# Patient Record
Sex: MALE | Race: WHITE | Employment: OTHER | URBAN - METROPOLITAN AREA
[De-identification: names, ages, dates, MRNs, and addresses within clinical notes are randomized per-mention and may not be internally consistent; named-entity substitution may affect disease eponyms.]

---

## 2019-09-19 ENCOUNTER — PRE VISIT (OUTPATIENT)
Dept: CARDIOLOGY | Facility: CLINIC | Age: 64
End: 2019-09-19

## 2019-09-20 PROBLEM — R00.2 PALPITATIONS: Status: ACTIVE | Noted: 2019-09-20

## 2019-09-20 PROBLEM — M79.89 LEG SWELLING: Status: ACTIVE | Noted: 2019-09-20

## 2019-09-23 ENCOUNTER — OFFICE VISIT (OUTPATIENT)
Dept: CARDIOLOGY | Facility: CLINIC | Age: 64
End: 2019-09-23
Payer: COMMERCIAL

## 2019-09-23 VITALS
DIASTOLIC BLOOD PRESSURE: 90 MMHG | WEIGHT: 185.1 LBS | HEART RATE: 80 BPM | BODY MASS INDEX: 25.91 KG/M2 | SYSTOLIC BLOOD PRESSURE: 132 MMHG | HEIGHT: 71 IN

## 2019-09-23 DIAGNOSIS — I47.29 NSVT (NONSUSTAINED VENTRICULAR TACHYCARDIA) (H): Primary | ICD-10-CM

## 2019-09-23 DIAGNOSIS — I21.3 ST ELEVATION MYOCARDIAL INFARCTION (STEMI), UNSPECIFIED ARTERY (H): ICD-10-CM

## 2019-09-23 DIAGNOSIS — I25.10 CORONARY ARTERY DISEASE INVOLVING NATIVE CORONARY ARTERY OF NATIVE HEART WITHOUT ANGINA PECTORIS: ICD-10-CM

## 2019-09-23 PROCEDURE — 93005 ELECTROCARDIOGRAM TRACING: CPT | Performed by: INTERNAL MEDICINE

## 2019-09-23 PROCEDURE — 99214 OFFICE O/P EST MOD 30 MIN: CPT | Performed by: INTERNAL MEDICINE

## 2019-09-23 RX ORDER — METOPROLOL SUCCINATE 25 MG/1
25 TABLET, EXTENDED RELEASE ORAL EVERY EVENING
Qty: 90 TABLET | Refills: 3 | Status: SHIPPED | OUTPATIENT
Start: 2019-09-23 | End: 2020-12-28

## 2019-09-23 ASSESSMENT — MIFFLIN-ST. JEOR: SCORE: 1646.74

## 2019-09-23 NOTE — LETTER
"9/23/2019    KASANDRA HSU [facility] [address] [address]    RE: Jhonny Kauffman       Dear Colleague,    I had the pleasure of seeing Jhonny Kauffman in the [facility].      Cardiology       I had the pleasure of seeing your patient, Mr. Jhonny Kauffman, in the Cardiovascular Medicine Clinic at [facility].  I had the pleasure of meeting this 64-year-old gentleman during his lateral MI which he suffered in 11/2015.  At that point, the patient had symptoms which required emergent angioplasty.  He had single vessel coronary disease, namely a circumflex which required a drug-eluting stent.  Remainder of his coronaries had moderate nonocclusive disease.    I have not seen him 2016.  Patient overall has done well however reports of few episodes of palpitations.  He describes a skipped beat which occurs and a sensation in his throat.  These are self-limiting and typically subside on their own.  He has stopped all of his medications with the exception of aspirin.  This includes his beta-blocker and ACE inhibitor along these statin therapy.  He states that now that he is on Medicare he is able to go to regular clinic visits.  He presents to reestablish care.  Another issue more recently is a diagnosis of superficial thrombophlebitis for which conservative therapy was prescribed.  He does state that he is relatively sedentary at work and sits at his desk the majority of the day.  His symptoms are improving.       PHYSICAL EXAMINATION:   VITAL SIGNS:  Blood pressure (!) 132/90, pulse 80, height 1.803 m (5' 11\"), weight 84 kg (185 lb 1.6 oz).  GENERAL:  The patient is alert, oriented, in no apparent distress.   HEENT:  Oropharynx clear, no sinus tenderness.   NECK:  No JVP, no lymphadenopathy, no carotid bruits.   CARDIOVASCULAR:  Regular rate and rhythm, normal S1, S2, no murmurs, gallops or rubs.   LUNGS:  Coarse but clear.   ABDOMEN:  Soft, nontender, nondistended. "   EXTREMITIES:  No clubbing, cyanosis or edema.      ASSESSMENT:   1.  Lateral MI, status post PCI to circumflex 2015  2.  Moderate nonocclusive disease in LAD.   3.  Low normal LV systolic function with inferolateral wall motion abnormality.   4.  Peak troponin of 98.   5.  Hyperlipidemia.   6.  Family history of coronary artery disease.   7.  Mildly enlarged proximal ascending aorta at 4.0 cm.      RECOMMENDATIONS:   1.  Atherosclerotic coronary disease.  No return of symptoms.  We will restart his beta-blocker and I feel this is also useful for his palpitations.  Continue with aspirin.  2.  We will get an echocardiogram to follow-up on his ascending aortic dilatation.  3.  We have ordered updated lab work namely cholesterol panel.  If it is elevated patient is willing to reconsider taking his Lipitor again.  4.  We also discussed with patient that this year or next he is due for a surveillance stress test.  We will address this when he returns back in November.  5.  Patient would like to get back to his marathon running.  I will get the above mentioned studies and possibly a stress test prior to clearing him to resume his heavy exercise.  6.  Return to clinic November 21, 2019.      Thank you for allowing me to participate in the care of your patient.    Sincerely,     Viridiana Figueroa MD     [facility]    cc:   Casey Waite MD  [address] [address]

## 2019-09-23 NOTE — PROGRESS NOTES
"  Cardiology       I had the pleasure of seeing your patient, Mr. Jhonny Kauffman, in the Cardiovascular Medicine Clinic at [facility].  I had the pleasure of meeting this 64-year-old gentleman during his lateral MI which he suffered in 11/2015.  At that point, the patient had symptoms which required emergent angioplasty.  He had single vessel coronary disease, namely a circumflex which required a drug-eluting stent.  Remainder of his coronaries had moderate nonocclusive disease.    I have not seen him 2016.  Patient overall has done well however reports of few episodes of palpitations.  He describes a skipped beat which occurs and a sensation in his throat.  These are self-limiting and typically subside on their own.  He has stopped all of his medications with the exception of aspirin.  This includes his beta-blocker and ACE inhibitor along these statin therapy.  He states that now that he is on Medicare he is able to go to regular clinic visits.  He presents to reestablish care.  Another issue more recently is a diagnosis of superficial thrombophlebitis for which conservative therapy was prescribed.  He does state that he is relatively sedentary at work and sits at his desk the majority of the day.  His symptoms are improving.       PHYSICAL EXAMINATION:   VITAL SIGNS:  Blood pressure (!) 132/90, pulse 80, height 1.803 m (5' 11\"), weight 84 kg (185 lb 1.6 oz).  GENERAL:  The patient is alert, oriented, in no apparent distress.   HEENT:  Oropharynx clear, no sinus tenderness.   NECK:  No JVP, no lymphadenopathy, no carotid bruits.   CARDIOVASCULAR:  Regular rate and rhythm, normal S1, S2, no murmurs, gallops or rubs.   LUNGS:  Coarse but clear.   ABDOMEN:  Soft, nontender, nondistended.   EXTREMITIES:  No clubbing, cyanosis or edema.      ASSESSMENT:   1.  Lateral MI, status post PCI to circumflex 2015  2.  Moderate nonocclusive disease in LAD.   3.  Low normal LV systolic function with inferolateral wall motion " abnormality.   4.  Peak troponin of 98.   5.  Hyperlipidemia.   6.  Family history of coronary artery disease.   7.  Mildly enlarged proximal ascending aorta at 4.0 cm.      RECOMMENDATIONS:   1.  Atherosclerotic coronary disease.  No return of symptoms.  We will restart his beta-blocker and I feel this is also useful for his palpitations.  Continue with aspirin.  2.  We will get an echocardiogram to follow-up on his ascending aortic dilatation.  3.  We have ordered updated lab work namely cholesterol panel.  If it is elevated patient is willing to reconsider taking his Lipitor again.  4.  We also discussed with patient that this year or next he is due for a surveillance stress test.  We will address this when he returns back in November.  5.  Patient would like to get back to his marathon running.  I will get the above mentioned studies and possibly a stress test prior to clearing him to resume his heavy exercise.  6.  Return to clinic November 21, 2019.      Viridiana Figueroa MD

## 2019-10-23 ENCOUNTER — HOSPITAL ENCOUNTER (OUTPATIENT)
Dept: CARDIOLOGY | Facility: CLINIC | Age: 64
Discharge: HOME | End: 2019-10-23
Attending: INTERNAL MEDICINE | Admitting: INTERNAL MEDICINE
Payer: COMMERCIAL

## 2019-10-23 DIAGNOSIS — I25.10 CORONARY ARTERY DISEASE INVOLVING NATIVE CORONARY ARTERY OF NATIVE HEART WITHOUT ANGINA PECTORIS: ICD-10-CM

## 2019-10-23 LAB
ALT SERPL W P-5'-P-CCNC: 19 U/L (ref 0–70)
CHOLEST SERPL-MCNC: 224 MG/DL
HDLC SERPL-MCNC: 45 MG/DL
LDLC SERPL CALC-MCNC: 155 MG/DL
NONHDLC SERPL-MCNC: 179 MG/DL
TRIGL SERPL-MCNC: 120 MG/DL

## 2019-10-23 PROCEDURE — 93306 TTE W/DOPPLER COMPLETE: CPT | Mod: 26 | Performed by: INTERNAL MEDICINE

## 2019-10-23 PROCEDURE — 93306 TTE W/DOPPLER COMPLETE: CPT

## 2019-10-23 PROCEDURE — 36415 COLL VENOUS BLD VENIPUNCTURE: CPT | Performed by: INTERNAL MEDICINE

## 2019-10-23 PROCEDURE — 80061 LIPID PANEL: CPT | Performed by: INTERNAL MEDICINE

## 2019-10-23 PROCEDURE — 84460 ALANINE AMINO (ALT) (SGPT): CPT | Performed by: INTERNAL MEDICINE

## 2019-10-25 DIAGNOSIS — I25.10 CORONARY ARTERY DISEASE INVOLVING NATIVE CORONARY ARTERY OF NATIVE HEART WITHOUT ANGINA PECTORIS: Primary | ICD-10-CM

## 2019-10-25 RX ORDER — ATORVASTATIN CALCIUM 40 MG/1
40 TABLET, FILM COATED ORAL DAILY
Qty: 90 TABLET | Refills: 3 | Status: SHIPPED | OUTPATIENT
Start: 2019-10-25 | End: 2020-04-29

## 2019-10-25 NOTE — PROGRESS NOTES
Left VM with patient regarding the need to restart his Lipitor 40 mg once a day. Jhonny had just had his numbers check and his cholesterol has gone up to 224 and he has not been taking his meds for awhile. Spoke with Dr Figueroa and reordered his Lipitor and will recheck a lipid panel in 3 months. Told Jhonny to call with any questions.Mavis Sanders RN

## 2019-11-19 DIAGNOSIS — I25.10 CORONARY ARTERY DISEASE INVOLVING NATIVE CORONARY ARTERY OF NATIVE HEART WITHOUT ANGINA PECTORIS: Primary | ICD-10-CM

## 2019-11-19 NOTE — PROGRESS NOTES
Spoke with Jhonny regarding his last echo and that ascending aorta is Mildly dilated.In comparison to the previous report dated 09/28/2016, the findings are  Similar. He states he will follow up with Dr Figueroa in the next 6 months and that order was placed. He also informed me that he did not restart his Lipitor and wishes to make changes to his diet and exercise instead. Orders also placed for repeat lipid panel when he returns for follow up visit.Mavis Sanders RN

## 2020-03-10 ENCOUNTER — HEALTH MAINTENANCE LETTER (OUTPATIENT)
Age: 65
End: 2020-03-10

## 2020-03-11 ENCOUNTER — TRANSFERRED RECORDS (OUTPATIENT)
Dept: HEALTH INFORMATION MANAGEMENT | Facility: CLINIC | Age: 65
End: 2020-03-11

## 2020-04-27 ENCOUNTER — HOSPITAL ENCOUNTER (OUTPATIENT)
Dept: LAB | Facility: CLINIC | Age: 65
Discharge: HOME | End: 2020-04-27
Attending: INTERNAL MEDICINE | Admitting: INTERNAL MEDICINE
Payer: COMMERCIAL

## 2020-04-27 DIAGNOSIS — I25.10 CORONARY ARTERY DISEASE INVOLVING NATIVE CORONARY ARTERY OF NATIVE HEART WITHOUT ANGINA PECTORIS: ICD-10-CM

## 2020-04-27 LAB
CHOLEST SERPL-MCNC: 239 MG/DL
HDLC SERPL-MCNC: 46 MG/DL
LDLC SERPL CALC-MCNC: 173 MG/DL
NONHDLC SERPL-MCNC: 193 MG/DL
TRIGL SERPL-MCNC: 98 MG/DL

## 2020-04-27 PROCEDURE — 36415 COLL VENOUS BLD VENIPUNCTURE: CPT | Performed by: INTERNAL MEDICINE

## 2020-04-27 PROCEDURE — 80061 LIPID PANEL: CPT | Performed by: INTERNAL MEDICINE

## 2020-04-29 ENCOUNTER — VIRTUAL VISIT (OUTPATIENT)
Dept: CARDIOLOGY | Facility: CLINIC | Age: 65
End: 2020-04-29
Attending: INTERNAL MEDICINE
Payer: COMMERCIAL

## 2020-04-29 DIAGNOSIS — I10 ESSENTIAL HYPERTENSION: ICD-10-CM

## 2020-04-29 DIAGNOSIS — E78.00 PURE HYPERCHOLESTEROLEMIA: ICD-10-CM

## 2020-04-29 DIAGNOSIS — I25.10 CORONARY ARTERY DISEASE INVOLVING NATIVE CORONARY ARTERY OF NATIVE HEART WITHOUT ANGINA PECTORIS: Primary | ICD-10-CM

## 2020-04-29 PROCEDURE — 99213 OFFICE O/P EST LOW 20 MIN: CPT | Mod: 95 | Performed by: INTERNAL MEDICINE

## 2020-04-29 RX ORDER — ATORVASTATIN CALCIUM 40 MG/1
40 TABLET, FILM COATED ORAL DAILY
Qty: 90 TABLET | Refills: 3 | Status: SHIPPED | OUTPATIENT
Start: 2020-04-29 | End: 2020-08-26

## 2020-04-29 NOTE — PROGRESS NOTES
"Jhonny Kauffman is a 64 year old male who is being evaluated via a billable video visit.      The patient has been notified of following:     \"This video visit will be conducted via a call between you and your physician/provider. We have found that certain health care needs can be provided without the need for an in-person physical exam.  This service lets us provide the care you need with a video conversation.  If a prescription is necessary we can send it directly to your pharmacy.  If lab work is needed we can place an order for that and you can then stop by our lab to have the test done at a later time.    Video visits are billed at different rates depending on your insurance coverage.  Please reach out to your insurance provider with any questions.    If during the course of the call the physician/provider feels a video visit is not appropriate, you will not be charged for this service.\"    Patient has given verbal consent for Video visit? Yes    How would you like to obtain your AVS? Jovanny    Patient would like the video invitation sent by: Text to cell phone: [phone]    Will anyone else be joining your video visit? No      HPI:      Patient is a 64-year-old male who underwent PCI to circumflex vessel in 2015.  Moderate disease noted elsewhere however nonocclusive.  He has had no return of his symptoms.  He had an echocardiogram performed 2019 the findings are at baseline.  He had some resistance and reluctance to take his medical therapy.  Initial difficulty was financial however since he is on Medicare he has been able to afford his medical therapy.  Also he has been reluctant to take any statin therapy.  We have tried diet therapy and his cholesterol levels from a total cholesterol and LDL standpoint are still elevated.  Here to discuss findings and plan for additional care no active symptoms.    1.  Lateral MI, status post PCI to circumflex 2015  2.  Moderate nonocclusive disease in LAD.   3.  Low " normal LV systolic function with inferolateral wall motion abnormality.   4.  Peak troponin of 98.   5.  Hyperlipidemia.   6.  Family history of coronary artery disease.   7.  Mildly enlarged proximal ascending aorta at 4.0 cm.     Review Of Systems  Skin: NEGATIVE  Eyes:Ears/Nose/Throat: NEGATIVE  Respiratory: NEGATIVE  Cardiovascular:  Palpitations the last couple days  Gastrointestinal: NEGATIVE  Genitourinary:NEGATIVE   Musculoskeletal: Joint pain  Neurologic: NEGATIVE  Psychiatric: NEGATIVE  Hematologic/Lymphatic/Immunologic: NEGATIVE  Endocrine:  NEGATIVE    Echo: 2019  The left ventricle is normal in size. Left Ventricle false tendon. There is  normal left ventricular wall thickness. Left ventricular systolic function is  low normal. The visual ejection fraction is estimated at 50-55%. Left  ventricular diastolic function is normal. There is moderate hypokinesis of the  mid to basal inferolateral wall and the basal anterolateral wall.  The right ventricle is normal size. The right ventricular systolic function is  normal.  Trace mitral and tricuspid regurgitation.  Mild aortic root dilatation. The ascending aorta is Mildly dilated.  No pericardial effusion.  In comparison to the previous report dated 09/28/2016, the findings are  similar.    GENERAL: healthy, alert and no distress  EYES: Eyes grossly normal to inspection, conjunctivae and sclerae normal  RESP: no audible wheeze, cough, or visible cyanosis.  No visible retractions or increased work of breathing.  Able to speak fully in complete sentences.  NEURO: Cranial nerves grossly intact, mentation intact and speech normal  PSYCH: mentation appears normal, affect normal/bright, judgement and insight intact, normal speech and appearance well-groomed      Plan    1.  Continue aspirin and beta-blocker  2.  We had a lengthy discussion and patient is now willing to take statin therapy.  He will start Lipitor 40 mg daily and we will recheck his cholesterol in 3  months time  3.  Return to clinic in 1 year  4.  We will defer nuclear myocardial perfusion study from a surveillance standpoint till next year due to COVID pandemic and patient asymptomatic at present.      Lola MOYA    Video-Visit Details    Type of service:  Video Visit    Video Start Time: 2:38 PM  Video End Time: 2:53 PM    Originating Location (pt. Location): Home    Distant Location (provider location):  [facility]     Mode of Communication:  Video Conference via Doximity      Viridiana iFgueroa MD

## 2020-04-29 NOTE — LETTER
4/29/2020      RE: Jhonny Kauffman  [address] [place] [address]       Dear Colleague,    Thank you for the opportunity to participate in the care of your patient, Jhonny Kauffman, at the [facility]. Please see a copy of my visit note below.    Patient is a 64-year-old male who underwent PCI to circumflex vessel in 2015.  Moderate disease noted elsewhere however nonocclusive.  He has had no return of his symptoms.  He had an echocardiogram performed 2019 the findings are at baseline.  He had some resistance and reluctance to take his medical therapy.  Initial difficulty was financial however since he is on Medicare he has been able to afford his medical therapy.  Also he has been reluctant to take any statin therapy.  We have tried diet therapy and his cholesterol levels from a total cholesterol and LDL standpoint are still elevated.  Here to discuss findings and plan for additional care no active symptoms.    1.  Lateral MI, status post PCI to circumflex 2015  2.  Moderate nonocclusive disease in LAD.   3.  Low normal LV systolic function with inferolateral wall motion abnormality.   4.  Peak troponin of 98.   5.  Hyperlipidemia.   6.  Family history of coronary artery disease.   7.  Mildly enlarged proximal ascending aorta at 4.0 cm.     Review Of Systems  Skin: NEGATIVE  Eyes:Ears/Nose/Throat: NEGATIVE  Respiratory: NEGATIVE  Cardiovascular:  Palpitations the last couple days  Gastrointestinal: NEGATIVE  Genitourinary:NEGATIVE   Musculoskeletal: Joint pain  Neurologic: NEGATIVE  Psychiatric: NEGATIVE  Hematologic/Lymphatic/Immunologic: NEGATIVE  Endocrine:  NEGATIVE    Echo: 2019  The left ventricle is normal in size. Left Ventricle false tendon. There is  normal left ventricular wall thickness. Left ventricular systolic function is  low normal. The visual ejection fraction is estimated at 50-55%. Left  ventricular  diastolic function is normal. There is moderate hypokinesis of the  mid to basal inferolateral wall and the basal anterolateral wall.  The right ventricle is normal size. The right ventricular systolic function is  normal.  Trace mitral and tricuspid regurgitation.  Mild aortic root dilatation. The ascending aorta is Mildly dilated.  No pericardial effusion.  In comparison to the previous report dated 09/28/2016, the findings are  similar.    GENERAL: healthy, alert and no distress  EYES: Eyes grossly normal to inspection, conjunctivae and sclerae normal  RESP: no audible wheeze, cough, or visible cyanosis.  No visible retractions or increased work of breathing.  Able to speak fully in complete sentences.  NEURO: Cranial nerves grossly intact, mentation intact and speech normal  PSYCH: mentation appears normal, affect normal/bright, judgement and insight intact, normal speech and appearance well-groomed    Plan    1.  Continue aspirin and beta-blocker  2.  We had a lengthy discussion and patient is now willing to take statin therapy.  He will start Lipitor 40 mg daily and we will recheck his cholesterol in 3 months time  3.  Return to clinic in 1 year  4.  We will defer nuclear myocardial perfusion study from a surveillance standpoint till next year due to COVID pandemic and patient asymptomatic at present.    Please do not hesitate to contact me if you have any questions/concerns.     Sincerely,     Viridiana Figueroa MD

## 2020-07-01 ENCOUNTER — TELEPHONE (OUTPATIENT)
Dept: CARDIOLOGY | Facility: CLINIC | Age: 65
End: 2020-07-01

## 2020-07-01 DIAGNOSIS — R00.2 PALPITATIONS: Primary | ICD-10-CM

## 2020-07-01 NOTE — TELEPHONE ENCOUNTER
"Patient called to report feeling palpitations. He states he has had them for a while, \"but lately I would say I feel them about 75% of my day.\" He describes them as \"skipped beats\" and feels \"like a hollow feeling with sometimes an urge to cough.\" He states he discussed them with Dr. Figueroa during his visit with him in April 2020, but at that time they were infrequent. No testing was ordered. Patient is wondering what we could do. I told him I would discuss symptoms with Dr. Figueroa and see if there is any testing he would like to do. I will call him back.  Swathi Tompkins RN  [facility]    "

## 2020-07-02 NOTE — TELEPHONE ENCOUNTER
Reviewed pt call with Dr. Figueroa, he would like pt to have ZioPatch for 7 days then BENSON follow-up.     These recommendations were called out to the patient and left in a detailed voicemail. Direct contact information provided.    Swathi Tompkins RN  [facility]

## 2020-07-06 NOTE — TELEPHONE ENCOUNTER
Returned voicemail from patient. He inquired about his ability to golf and swim while wearing ziopatch. I left a voicemail for the patient and I informed him golf would be okay, not swimming. I asked him to bring any additional questions to his appt to apply patch. Direct contact information provided in voicemail as well.    Swathi Tompkins RN  [facility]

## 2020-07-20 ENCOUNTER — HOSPITAL ENCOUNTER (OUTPATIENT)
Dept: CARDIOLOGY | Facility: CLINIC | Age: 65
Discharge: HOME | End: 2020-07-20
Attending: INTERNAL MEDICINE | Admitting: INTERNAL MEDICINE
Payer: COMMERCIAL

## 2020-07-20 DIAGNOSIS — R00.2 PALPITATIONS: ICD-10-CM

## 2020-07-20 PROCEDURE — 0296T LEADLESS EKG MONITOR 3 TO 14 DAYS: CPT

## 2020-07-20 PROCEDURE — 0298T LEADLESS EKG MONITOR 3 TO 14 DAYS: CPT | Performed by: INTERNAL MEDICINE

## 2020-08-05 DIAGNOSIS — I35.0 AORTIC VALVE STENOSIS, ETIOLOGY OF CARDIAC VALVE DISEASE UNSPECIFIED: Primary | ICD-10-CM

## 2020-08-05 NOTE — PROGRESS NOTES
Jhonny called and would like a cbc added to his alb work, he has not had one done since 2015, Lab added.Mavis Sanders RN

## 2020-08-20 DIAGNOSIS — E78.00 PURE HYPERCHOLESTEROLEMIA: ICD-10-CM

## 2020-08-20 DIAGNOSIS — I35.0 AORTIC VALVE STENOSIS, ETIOLOGY OF CARDIAC VALVE DISEASE UNSPECIFIED: ICD-10-CM

## 2020-08-20 LAB
ALT SERPL W P-5'-P-CCNC: 30 U/L (ref 0–70)
CHOLEST SERPL-MCNC: 154 MG/DL
ERYTHROCYTE [DISTWIDTH] IN BLOOD BY AUTOMATED COUNT: 13.1 % (ref 10–15)
HCT VFR BLD AUTO: 46.9 % (ref 40–53)
HDLC SERPL-MCNC: 42 MG/DL
HGB BLD-MCNC: 15.1 G/DL (ref 13.3–17.7)
LDLC SERPL CALC-MCNC: 93 MG/DL
MCH RBC QN AUTO: 27.3 PG (ref 26.5–33)
MCHC RBC AUTO-ENTMCNC: 32.2 G/DL (ref 31.5–36.5)
MCV RBC AUTO: 85 FL (ref 78–100)
NONHDLC SERPL-MCNC: 112 MG/DL
PLATELET # BLD AUTO: 268 10E9/L (ref 150–450)
RBC # BLD AUTO: 5.54 10E12/L (ref 4.4–5.9)
TRIGL SERPL-MCNC: 93 MG/DL
WBC # BLD AUTO: 4.7 10E9/L (ref 4–11)

## 2020-08-20 PROCEDURE — 80061 LIPID PANEL: CPT | Performed by: INTERNAL MEDICINE

## 2020-08-20 PROCEDURE — 36415 COLL VENOUS BLD VENIPUNCTURE: CPT | Performed by: INTERNAL MEDICINE

## 2020-08-20 PROCEDURE — 84460 ALANINE AMINO (ALT) (SGPT): CPT | Performed by: INTERNAL MEDICINE

## 2020-08-20 PROCEDURE — 85027 COMPLETE CBC AUTOMATED: CPT | Performed by: INTERNAL MEDICINE

## 2020-08-26 ENCOUNTER — VIRTUAL VISIT (OUTPATIENT)
Dept: CARDIOLOGY | Facility: CLINIC | Age: 65
End: 2020-08-26
Attending: INTERNAL MEDICINE
Payer: COMMERCIAL

## 2020-08-26 DIAGNOSIS — I25.10 CORONARY ARTERY DISEASE INVOLVING NATIVE CORONARY ARTERY OF NATIVE HEART WITHOUT ANGINA PECTORIS: ICD-10-CM

## 2020-08-26 DIAGNOSIS — R00.2 PALPITATIONS: ICD-10-CM

## 2020-08-26 DIAGNOSIS — I21.3 ST ELEVATION MYOCARDIAL INFARCTION (STEMI), UNSPECIFIED ARTERY (H): Primary | ICD-10-CM

## 2020-08-26 DIAGNOSIS — E78.2 MIXED HYPERLIPIDEMIA: ICD-10-CM

## 2020-08-26 PROCEDURE — 99213 OFFICE O/P EST LOW 20 MIN: CPT | Mod: 95 | Performed by: NURSE PRACTITIONER

## 2020-08-26 RX ORDER — ATORVASTATIN CALCIUM 40 MG/1
40 TABLET, FILM COATED ORAL DAILY
Qty: 90 TABLET | Refills: 3 | Status: SHIPPED | OUTPATIENT
Start: 2020-08-26

## 2020-08-26 NOTE — LETTER
"8/26/2020    KASANDRA HSU [facility] [address] [address]    RE: Jhonny Kauffman       Dear Colleague,    I had the pleasure of seeing Jhonny Kauffman in the [facility].    Jhonny Kauffman is a 65 year old male who is being evaluated via a billable telephone visit.      The patient has been notified of following:     \"This telephone visit will be conducted via a call between you and your physician/provider. We have found that certain health care needs can be provided without the need for a physical exam.  This service lets us provide the care you need with a short phone conversation.  If a prescription is necessary we can send it directly to your pharmacy.  If lab work is needed we can place an order for that and you can then stop by our lab to have the test done at a later time.    Telephone visits are billed at different rates depending on your insurance coverage. During this emergency period, for some insurers they may be billed the same as an in-person visit.  Please reach out to your insurance provider with any questions.    If during the course of the call the physician/provider feels a telephone visit is not appropriate, you will not be charged for this service.\"    Patient has given verbal consent for Telephone visit?  Yes    What phone number would you like to be contacted at? [phone]    How would you like to obtain your AVS? MyChart  Review Of Systems  Skin: NEGATIVE  Eyes:Ears/Nose/Throat: NEGATIVE  Respiratory: NEGATIVE  Cardiovascular:NEGATIVE  Gastrointestinal: NEGATIVE  Genitourinary:NEGATIVE   Musculoskeletal: NEGATIVE  Neurologic: NEGATIVE  Psychiatric: NEGATIVE  Hematologic/Lymphatic/Immunologic: NEGATIVE  Endocrine:  NEGATIVE  Unable to assess vitals.   Steffi Proctor LPN    Phone call duration: 20 minutes    LUC ZAMARRIPA CNP     Reason for visit: Follow up Austyn Dee    Primary cardiologist: Dr. Figueroa     History of presenting " "illness:    Jhonny Kauffman, a pleasant 65 year old patient who has a past medical history of coronary artery disease status post lateral STEMI and PCI to his circumflex in 2015 with moderate nonocclusive disease noted elsewhere at that time, hyperlipidemia, family history of coronary artery disease, and mild enlargement of his proximal ascending aorta at 4.0 cm.    He previously has resistance to medical therapy due to financial concerns he is currently on Medicare and now has been able to afford his medications.  Although, he is resistant to statin therapy and despite diet modifications his LDL is still not within goal.  The patient had an office visit with Dr. Figueroa in April 2020 and at that time he elected to start Lipitor 40 mg daily.    Last month he called the clinic and reported palpitations that he described as \"skipped beats\" and \"like a hollow feeling that sometimes and urged to cough\".  The symptoms were reviewed with Dr. Figueroa and was recommended that he undergo a ZIO Patch and follow-up today.  The monitor revealed one episode of VT (5 beats) and 16 episodes of SVT and occasional PVCs with about a 1% burden.  His symptoms correlated with PVCs.  He also had fasting lipids prior to the visit that noted total cholesterol 154, HDL 42, LDL 93 and triglycerides 93.    He states he still has occasional \"skips\" in his heartbeat that occur daily that can last up to 3 to 4 hours and that symptoms are suppressed with exercise and they do not interfere with his activities of daily living.  Reviewed the results of the monitor and that symptoms correlated with PVCs with a 1.4% burden.  He denies any syncope or presyncope, chest pressure or shortness of breath.  There is occasional lightheadedness with positional changes.  We also reviewed in detail his fasting lipid profile and discussed that his ultimate LDL goal would be less than 70 in the setting of previous MI and known residual coronary artery disease.  At this " time he is not interested in increasing his Lipitor dose and requests to pursue diet modification and increased exercise.         Assessment and Plan:     ASSESSMENT:    1. Palpitations    ZIO Patch noted that symptoms correlated with PVCs (1.4% burden)    Ongoing symptoms that do not interfere with activities of daily living    2. Coronary artery disease    Status post MI (peak troponin 98) and PCI to circumflex in 2015 with moderate nonocclusive disease elsewhere    Low normal LVEF with inferior wall motion abnormality    3. Hyperlipidemia    LDL improvement to 93 with the addition of Lipitor 40 mg daily    Patient request to attempt diet modification and increase exercise in order to decrease his LDL to goal (less than 70).    4. Mild enlargement of the proximal ascending aorta    Measured at 4.0 cm    PLAN:     1. Repeat fasting lipids in 3 months  2. Return to clinic in April 2021 or sooner if needed         Thank you for allowing me to participate in the care of your patient.    Sincerely,     LUC ZAMARRIPA [facility]

## 2020-08-26 NOTE — PROGRESS NOTES
"Jhonny Kauffman is a 65 year old male who is being evaluated via a billable telephone visit.      The patient has been notified of following:     \"This telephone visit will be conducted via a call between you and your physician/provider. We have found that certain health care needs can be provided without the need for a physical exam.  This service lets us provide the care you need with a short phone conversation.  If a prescription is necessary we can send it directly to your pharmacy.  If lab work is needed we can place an order for that and you can then stop by our lab to have the test done at a later time.    Telephone visits are billed at different rates depending on your insurance coverage. During this emergency period, for some insurers they may be billed the same as an in-person visit.  Please reach out to your insurance provider with any questions.    If during the course of the call the physician/provider feels a telephone visit is not appropriate, you will not be charged for this service.\"    Patient has given verbal consent for Telephone visit?  Yes    What phone number would you like to be contacted at? [phone]    How would you like to obtain your AVS? MyChart  Review Of Systems  Skin: NEGATIVE  Eyes:Ears/Nose/Throat: NEGATIVE  Respiratory: NEGATIVE  Cardiovascular:NEGATIVE  Gastrointestinal: NEGATIVE  Genitourinary:NEGATIVE   Musculoskeletal: NEGATIVE  Neurologic: NEGATIVE  Psychiatric: NEGATIVE  Hematologic/Lymphatic/Immunologic: NEGATIVE  Endocrine:  NEGATIVE  Unable to assess vitals.   Steffi Proctor LPN    Phone call duration: 20 minutes    LUC ZAMARRIPA CNP     Reason for visit: Follow up Zio Luiz    Primary cardiologist: Dr. Figueroa     History of presenting illness:    Jhonny Kauffman, a pleasant 65 year old patient who has a past medical history of coronary artery disease status post lateral STEMI and PCI to his circumflex in 2015 with moderate nonocclusive disease noted elsewhere at " "that time, hyperlipidemia, family history of coronary artery disease, and mild enlargement of his proximal ascending aorta at 4.0 cm.    He previously has resistance to medical therapy due to financial concerns he is currently on Medicare and now has been able to afford his medications.  Although, he is resistant to statin therapy and despite diet modifications his LDL is still not within goal.  The patient had an office visit with Dr. Figueroa in April 2020 and at that time he elected to start Lipitor 40 mg daily.    Last month he called the clinic and reported palpitations that he described as \"skipped beats\" and \"like a hollow feeling that sometimes and urged to cough\".  The symptoms were reviewed with Dr. Figueroa and was recommended that he undergo a ZIO Patch and follow-up today.  The monitor revealed one episode of VT (5 beats) and 16 episodes of SVT and occasional PVCs with about a 1% burden.  His symptoms correlated with PVCs.  He also had fasting lipids prior to the visit that noted total cholesterol 154, HDL 42, LDL 93 and triglycerides 93.    He states he still has occasional \"skips\" in his heartbeat that occur daily that can last up to 3 to 4 hours and that symptoms are suppressed with exercise and they do not interfere with his activities of daily living.  Reviewed the results of the monitor and that symptoms correlated with PVCs with a 1.4% burden.  He denies any syncope or presyncope, chest pressure or shortness of breath.  There is occasional lightheadedness with positional changes.  We also reviewed in detail his fasting lipid profile and discussed that his ultimate LDL goal would be less than 70 in the setting of previous MI and known residual coronary artery disease.  At this time he is not interested in increasing his Lipitor dose and requests to pursue diet modification and increased exercise.         Assessment and Plan:     ASSESSMENT:    1. Palpitations    ZIO Patch noted that symptoms " correlated with PVCs (1.4% burden)    Ongoing symptoms that do not interfere with activities of daily living    2. Coronary artery disease    Status post MI (peak troponin 98) and PCI to circumflex in 2015 with moderate nonocclusive disease elsewhere    Low normal LVEF with inferior wall motion abnormality    3. Hyperlipidemia    LDL improvement to 93 with the addition of Lipitor 40 mg daily    Patient request to attempt diet modification and increase exercise in order to decrease his LDL to goal (less than 70).    4. Mild enlargement of the proximal ascending aorta    Measured at 4.0 cm    PLAN:     1. Repeat fasting lipids in 3 months  2. Return to clinic in April 2021 or sooner if needed       Cinthya Diez, APRN, CNP

## 2020-08-26 NOTE — PATIENT INSTRUCTIONS
Today's Recommendations    1. Exercise and diet modification  2. Continue all other medications without changes.  3. Please repeat fasting lipids in 3 months  4. Follow up with Dr. Figueroa in April 2021 or sooner if needed    Please send a "[employer] message or call [phone] with questions or concerns.     Scheduling number [phone].

## 2020-12-27 ENCOUNTER — HEALTH MAINTENANCE LETTER (OUTPATIENT)
Age: 65
End: 2020-12-27

## 2020-12-28 DIAGNOSIS — I21.3 ST ELEVATION MYOCARDIAL INFARCTION (STEMI), UNSPECIFIED ARTERY (H): ICD-10-CM

## 2020-12-28 RX ORDER — METOPROLOL SUCCINATE 25 MG/1
25 TABLET, EXTENDED RELEASE ORAL EVERY EVENING
Qty: 90 TABLET | Refills: 0 | Status: SHIPPED | OUTPATIENT
Start: 2020-12-28 | End: 2021-04-05

## 2021-04-05 DIAGNOSIS — I21.3 ST ELEVATION MYOCARDIAL INFARCTION (STEMI), UNSPECIFIED ARTERY (H): ICD-10-CM

## 2021-04-05 RX ORDER — METOPROLOL SUCCINATE 25 MG/1
25 TABLET, EXTENDED RELEASE ORAL EVERY EVENING
Qty: 90 TABLET | Refills: 0 | Status: SHIPPED | OUTPATIENT
Start: 2021-04-05 | End: 2021-07-19

## 2021-04-24 ENCOUNTER — HEALTH MAINTENANCE LETTER (OUTPATIENT)
Age: 66
End: 2021-04-24

## 2021-07-19 DIAGNOSIS — I21.3 ST ELEVATION MYOCARDIAL INFARCTION (STEMI), UNSPECIFIED ARTERY (H): ICD-10-CM

## 2021-07-19 RX ORDER — METOPROLOL SUCCINATE 25 MG/1
25 TABLET, EXTENDED RELEASE ORAL EVERY EVENING
Qty: 90 TABLET | Refills: 0 | Status: SHIPPED | OUTPATIENT
Start: 2021-07-19

## 2021-08-24 DIAGNOSIS — I21.3 ST ELEVATION MYOCARDIAL INFARCTION (STEMI), UNSPECIFIED ARTERY (H): Primary | ICD-10-CM

## 2021-08-24 DIAGNOSIS — I25.10 CORONARY ARTERY DISEASE INVOLVING NATIVE CORONARY ARTERY OF NATIVE HEART WITHOUT ANGINA PECTORIS: ICD-10-CM

## 2021-08-24 DIAGNOSIS — I21.3 ST ELEVATION MYOCARDIAL INFARCTION (STEMI), UNSPECIFIED ARTERY (H): ICD-10-CM

## 2021-08-24 DIAGNOSIS — E78.2 MIXED HYPERLIPIDEMIA: ICD-10-CM

## 2021-09-22 ENCOUNTER — HOSPITAL ENCOUNTER (OUTPATIENT)
Dept: NUCLEAR MEDICINE | Facility: CLINIC | Age: 66
Setting detail: NUCLEAR MEDICINE
End: 2021-09-22
Attending: INTERNAL MEDICINE
Payer: COMMERCIAL

## 2021-09-22 ENCOUNTER — HOSPITAL ENCOUNTER (OUTPATIENT)
Dept: CARDIOLOGY | Facility: CLINIC | Age: 66
End: 2021-09-22
Attending: INTERNAL MEDICINE
Payer: COMMERCIAL

## 2021-09-22 DIAGNOSIS — I21.3 ST ELEVATION MYOCARDIAL INFARCTION (STEMI), UNSPECIFIED ARTERY (H): ICD-10-CM

## 2021-09-22 LAB
CV STRESS MAX HR HE: 146
NUC STRESS EJECTION FRACTION: 46 %
RATE PRESSURE PRODUCT: NORMAL
STRESS ECHO BASELINE DIASTOLIC HE: 82
STRESS ECHO BASELINE HR: 63
STRESS ECHO BASELINE SYSTOLIC BP: 132
STRESS ECHO CALCULATED PERCENT HR: 95 %
STRESS ECHO LAST STRESS DIASTOLIC BP: 90
STRESS ECHO LAST STRESS SYSTOLIC BP: 180
STRESS ECHO TARGET HR: 153

## 2021-09-22 PROCEDURE — 78452 HT MUSCLE IMAGE SPECT MULT: CPT | Mod: 26 | Performed by: INTERNAL MEDICINE

## 2021-09-22 PROCEDURE — 93016 CV STRESS TEST SUPVJ ONLY: CPT | Performed by: INTERNAL MEDICINE

## 2021-09-22 PROCEDURE — 343N000001 HC RX 343: Performed by: INTERNAL MEDICINE

## 2021-09-22 PROCEDURE — A9502 TC99M TETROFOSMIN: HCPCS | Performed by: INTERNAL MEDICINE

## 2021-09-22 PROCEDURE — 93018 CV STRESS TEST I&R ONLY: CPT | Performed by: INTERNAL MEDICINE

## 2021-09-22 PROCEDURE — 93017 CV STRESS TEST TRACING ONLY: CPT

## 2021-09-22 PROCEDURE — 78452 HT MUSCLE IMAGE SPECT MULT: CPT

## 2021-09-22 RX ADMIN — TETROFOSMIN 11 MCI.: 1.38 INJECTION, POWDER, LYOPHILIZED, FOR SOLUTION INTRAVENOUS at 12:10

## 2021-09-22 RX ADMIN — TETROFOSMIN 33 MCI.: 1.38 INJECTION, POWDER, LYOPHILIZED, FOR SOLUTION INTRAVENOUS at 13:37

## 2021-09-23 ENCOUNTER — TELEPHONE (OUTPATIENT)
Dept: CARDIOLOGY | Facility: CLINIC | Age: 66
End: 2021-09-23

## 2021-09-23 NOTE — TELEPHONE ENCOUNTER
VM received from patient inquiring if he had echocardiogram scheduled this week.     Telephoned patient, he has echo appt through [facility], he realized his PCP scheduled this. He will cancel as his cardiology care is through [place]. Stress test completed 9/21/21. Lipids checked with PCP in April 2021.    Swathi Tompkins RN  [facility]

## 2021-10-04 ENCOUNTER — HEALTH MAINTENANCE LETTER (OUTPATIENT)
Age: 66
End: 2021-10-04

## 2021-10-16 DIAGNOSIS — I21.3 ST ELEVATION MYOCARDIAL INFARCTION (STEMI), UNSPECIFIED ARTERY (H): ICD-10-CM

## 2021-10-18 RX ORDER — METOPROLOL SUCCINATE 25 MG/1
TABLET, EXTENDED RELEASE ORAL
Qty: 90 TABLET | Refills: 0 | OUTPATIENT
Start: 2021-10-18

## 2022-05-15 ENCOUNTER — HEALTH MAINTENANCE LETTER (OUTPATIENT)
Age: 67
End: 2022-05-15

## 2022-09-11 ENCOUNTER — HEALTH MAINTENANCE LETTER (OUTPATIENT)
Age: 67
End: 2022-09-11

## 2023-01-22 ENCOUNTER — HEALTH MAINTENANCE LETTER (OUTPATIENT)
Age: 68
End: 2023-01-22

## 2024-02-24 ENCOUNTER — HEALTH MAINTENANCE LETTER (OUTPATIENT)
Age: 69
End: 2024-02-24